# Patient Record
Sex: FEMALE | Race: WHITE | NOT HISPANIC OR LATINO | Employment: UNEMPLOYED | URBAN - NONMETROPOLITAN AREA
[De-identification: names, ages, dates, MRNs, and addresses within clinical notes are randomized per-mention and may not be internally consistent; named-entity substitution may affect disease eponyms.]

---

## 2017-11-20 ENCOUNTER — TRANSCRIBE ORDERS (OUTPATIENT)
Dept: ADMINISTRATIVE | Facility: HOSPITAL | Age: 27
End: 2017-11-20

## 2017-11-20 DIAGNOSIS — N92.6 IRREGULAR BLEEDING: Primary | ICD-10-CM

## 2017-11-28 ENCOUNTER — TRANSCRIBE ORDERS (OUTPATIENT)
Dept: ADMINISTRATIVE | Facility: HOSPITAL | Age: 27
End: 2017-11-28

## 2017-11-28 DIAGNOSIS — N92.6 IRREGULAR UTERINE BLEEDING: Primary | ICD-10-CM

## 2017-11-29 ENCOUNTER — HOSPITAL ENCOUNTER (OUTPATIENT)
Dept: ULTRASOUND IMAGING | Facility: HOSPITAL | Age: 27
Discharge: HOME | End: 2017-11-29

## 2017-11-29 ENCOUNTER — HOSPITAL ENCOUNTER (OUTPATIENT)
Dept: ULTRASOUND IMAGING | Facility: HOSPITAL | Age: 27
Discharge: HOME | End: 2017-11-29
Admitting: PHYSICIAN ASSISTANT

## 2017-11-29 DIAGNOSIS — N92.6 IRREGULAR BLEEDING: ICD-10-CM

## 2017-11-29 DIAGNOSIS — N92.6 IRREGULAR UTERINE BLEEDING: ICD-10-CM

## 2017-11-29 PROCEDURE — 76856 US EXAM PELVIC COMPLETE: CPT

## 2017-11-29 PROCEDURE — 76830 TRANSVAGINAL US NON-OB: CPT

## 2017-11-29 PROCEDURE — 76830 TRANSVAGINAL US NON-OB: CPT | Performed by: RADIOLOGY

## 2017-11-29 PROCEDURE — 76856 US EXAM PELVIC COMPLETE: CPT | Performed by: RADIOLOGY

## 2017-12-06 ENCOUNTER — APPOINTMENT (OUTPATIENT)
Dept: ULTRASOUND IMAGING | Facility: HOSPITAL | Age: 27
End: 2017-12-06

## 2019-03-29 ENCOUNTER — HOSPITAL ENCOUNTER (OUTPATIENT)
Dept: GENERAL RADIOLOGY | Facility: HOSPITAL | Age: 29
Discharge: HOME | End: 2019-03-29

## 2019-03-29 ENCOUNTER — TRANSCRIBE ORDERS (OUTPATIENT)
Dept: ADMINISTRATIVE | Facility: HOSPITAL | Age: 29
End: 2019-03-29

## 2019-03-29 DIAGNOSIS — M54.9 BACK PAIN, UNSPECIFIED BACK LOCATION, UNSPECIFIED BACK PAIN LATERALITY, UNSPECIFIED CHRONICITY: ICD-10-CM

## 2019-03-29 DIAGNOSIS — M54.9 BACK PAIN, UNSPECIFIED BACK LOCATION, UNSPECIFIED BACK PAIN LATERALITY, UNSPECIFIED CHRONICITY: Primary | ICD-10-CM

## 2020-01-15 LAB
EXTERNAL ABO GROUPING: NORMAL
EXTERNAL ANTIBODY SCREEN: NEGATIVE
EXTERNAL CHLAMYDIA SCREEN: NEGATIVE
EXTERNAL GONORRHEA SCREEN: NEGATIVE
EXTERNAL HEPATITIS B SURFACE ANTIGEN: NEGATIVE
EXTERNAL RH FACTOR: POSITIVE
EXTERNAL RUBELLA QUALITATIVE: NORMAL
EXTERNAL SYPHILIS RPR SCREEN: NORMAL
HIV1 P24 AG SERPL QL IA: NORMAL

## 2020-05-01 LAB
EXTERNAL GLUCOSE TOLERANCE TEST FASTING: 97 MG/DL
EXTERNAL GTT 1 HOUR: 181
EXTERNAL GTT 3 HOURS: 134

## 2020-06-23 LAB — EXTERNAL GROUP B STREP ANTIGEN: POSITIVE

## 2020-07-06 ENCOUNTER — HOSPITAL ENCOUNTER (OUTPATIENT)
Facility: HOSPITAL | Age: 30
Discharge: HOME | End: 2020-07-06
Attending: OBSTETRICS & GYNECOLOGY | Admitting: OBSTETRICS & GYNECOLOGY

## 2020-07-06 VITALS
RESPIRATION RATE: 16 BRPM | BODY MASS INDEX: 35.59 KG/M2 | HEIGHT: 64 IN | TEMPERATURE: 98.7 F | WEIGHT: 208.5 LBS | SYSTOLIC BLOOD PRESSURE: 124 MMHG | DIASTOLIC BLOOD PRESSURE: 80 MMHG | HEART RATE: 122 BPM

## 2020-07-06 PROBLEM — Z36.89 NON-STRESS TEST REACTIVE: Status: ACTIVE | Noted: 2020-07-06

## 2020-07-06 PROCEDURE — G0463 HOSPITAL OUTPT CLINIC VISIT: HCPCS

## 2020-07-06 PROCEDURE — 59025 FETAL NON-STRESS TEST: CPT

## 2020-07-06 RX ORDER — HYDROXYZINE PAMOATE 25 MG/1
25 CAPSULE ORAL 3 TIMES DAILY PRN
Qty: 60 CAPSULE | Refills: 0 | Status: SHIPPED | OUTPATIENT
Start: 2020-07-06

## 2020-07-06 RX ORDER — FLUCONAZOLE 100 MG/1
150 TABLET ORAL EVERY 24 HOURS
Status: COMPLETED | OUTPATIENT
Start: 2020-07-06 | End: 2020-07-06

## 2020-07-06 RX ADMIN — FLUCONAZOLE 150 MG: 100 TABLET ORAL at 14:37

## 2020-07-06 NOTE — NON STRESS TEST
Ysabel Biskarime, a  at 38w2d with an STELLA of 2020, by Patient Reported, was seen at [facility] for a nonstress test.    Chief Complaint   Patient presents with   • Non-stress Test     rash on thighs & underarms       Patient Active Problem List   Diagnosis   • Non-stress test reactive       Start Time: 1300  Stop Time: 1320    Interpretation A  Nonstress Test Interpretation A: Reactive (20 1254 : India Easley, RN)    brooks fajardo rn

## 2020-07-06 NOTE — H&P
RAKEL Bhatti  Obstetric History and Physical    Chief Complaint   Patient presents with   • Non-stress Test     rash on thighs & underarms       Subjective     Patient is a 30 y.o. female  currently at 38w2d, who presents with rash under both arms and in groin area for 3-4 days. Pt denies any other c/o. Pt reports good BS control. Pt reports +FM, denies VB/LOF/ctx.    Her prenatal care is complicated by  diabetes  GDM A1.  Her previous obstetric/gynecological history is noted for is non-contributory.    The following portions of the patients history were reviewed and updated as appropriate: current medications, allergies, past medical history, past surgical history, past family history, past social history and problem list .   Social History     Socioeconomic History   • Marital status:      Spouse name: Not on file   • Number of children: Not on file   • Years of education: Not on file   • Highest education level: Not on file     No past medical history on file.    Current Facility-Administered Medications:   •  fluconazole (DIFLUCAN) tablet 150 mg, 150 mg, Oral, Q24H, Priti Durant,   Allergies no known allergies  No past surgical history on file.      Prenatal Information:   Maternal Prenatal Labs  Blood Type No results found for: ABO   Rh Status No results found for: RH   Antibody Screen No results found for: ABSCRN   Rapid Urine Drug Screen No results found for: AMPMETHU, BARBITSCNUR, LABBENZSCN, LABMETHSCN, LABOPIASCN, THCURSCR, COCAINEUR, AMPHETSCREEN, PROPOXSCN, BUPRENORSCNU, METAMPSCNUR, OXYCODONESCN, TRICYCLICSCN   Group B Strep Culture No results found for: GBSANTIGEN                 Past OB History:     OB History    Para Term  AB Living   2 1 1 0 0 0   SAB TAB Ectopic Molar Multiple Live Births   0 0 0 0 0 0      # Outcome Date GA Lbr Lauro/2nd Weight Sex Delivery Anes PTL Lv   2 Current            1 Term 10/06/11               Past Medical History: No past medical  history on file.   Past Surgical History No past surgical history on file.   Family History: No family history on file.   Social History:  has no tobacco history on file.   has no alcohol history on file.   has no drug history on file.        Review of Systems      Objective     Vital Signs Range for the last 24 hours  Temperature: Temp:  [98.7 °F (37.1 °C)] 98.7 °F (37.1 °C)   Temp Source: Temp src: Oral   BP: BP: (124)/(80) 124/80   Pulse: Heart Rate:  [122] 122   Respirations: Resp:  [16] 16   Weight: Weight:  [94.6 kg (208 lb 8 oz)] 94.6 kg (208 lb 8 oz)     Physical Examination: General appearance - alert, well appearing, and in no distress, oriented to person, place, and time, normal appearing weight and well hydrated  Mental status - alert, oriented to person, place, and time, normal mood, behavior, speech, dress, motor activity, and thought processes, affect appropriate to mood  Neck - supple, no significant adenopathy  Chest - clear to auscultation, no wheezes, rales or rhonchi, symmetric air entry, no tachypnea, retractions or cyanosis  Heart - normal rate, regular rhythm, normal S1, S2, no murmurs, rubs, clicks or gallops  Abdomen - soft, nontender, gravid uterus, no masses or organomegaly  no rebound tenderness noted,   Pelvic - normal external genitalia, vulva, vagina, cervix, uterus and adnexa  Neurological - alert, oriented, normal speech, no focal findings or movement disorder noted  Musculoskeletal - no joint tenderness, deformity or swelling  Extremities - peripheral pulses normal, no pedal edema, no clubbing or cyanosis  Skin - normal coloration and turgor, no rashes, no suspicious skin lesions noted        Cervix: Exam by:     Dilation:     Effacement:     Station:       Laboratory Results:   Lab Results (last 24 hours)     ** No results found for the last 24 hours. **        Radiology Review:   Imaging Results (Last 72 Hours)     ** No results found for the last 72 hours. **             Assessment/Plan       Non-stress test reactive      Assessment & Plan    Assessment:  1.  Intrauterine pregnancy at 38w2d weeks gestation with reassuring fetal status.    2.  Pt 38.2 weeks with rash under arms and in groin area  3.  Obstetrical history significant for is non-contributory.  4.  GBS status: No results found for: GBSANTIGEN    Plan:  1. Rx for diflucan and vistaril given. R/B/A discussed, for IOL in 1 weeks.  2. Plan of care has been reviewed with patient   3.  Risks, benefits of treatment plan have been discussed.  4.  All questions have been answered.        Priti Durant, DO  7/6/2020  13:45

## 2020-07-06 NOTE — DISCHARGE INSTRUCTIONS
Pruritus  Pruritus is an itchy feeling on the skin. One of the most common causes is dry skin, but many different things can cause itching. Most cases of itching do not require medical attention. Sometimes itchy skin can turn into a rash.  Follow these instructions at home:  Skin care    · Apply moisturizing lotion to your skin as needed. Lotion that contains petroleum jelly is best.  · Take medicines or apply medicated creams only as told by your health care provider. This may include:  ? Corticosteroid cream.  ? Anti-itch lotions.  ? Oral antihistamines.  · Apply a cool, wet cloth (cool compress) to the affected areas.  · Take baths with one of the following:  ? Epsom salts. You can get these at your local pharmacy or grocery store. Follow the instructions on the packaging.  ? Baking soda. Pour a small amount into the bath as told by your health care provider.  ? Colloidal oatmeal. You can get this at your local pharmacy or grocery store. Follow the instructions on the packaging.  · Apply baking soda paste to your skin. To make the paste, stir water into a small amount of baking soda until it reaches a paste-like consistency.  · Do not scratch your skin.  · Do not take hot showers or baths, which can make itching worse. A cool shower may help with itching as long as you apply moisturizing lotion after the shower.  · Do not use scented soaps, detergents, perfumes, and cosmetic products. Instead, use gentle, unscented versions of these items.  General instructions  · Avoid wearing tight clothes.  · Keep a journal to help find out what is causing your itching. Write down:  ? What you eat and drink.  ? What cosmetic products you use.  ? What soaps or detergents you use.  ? What you wear, including jewelry.  · Use a humidifier. This keeps the air moist, which helps to prevent dry skin.  · Be aware of any changes in your itchiness.  Contact a health care provider if:  · The itching does not go away after several  days.  · You are unusually thirsty or urinating more than normal.  · Your skin tingles or feels numb.  · Your skin or the white parts of your eyes turn yellow (jaundice).  · You feel weak.  · You have any of the following:  ? Night sweats.  ? Tiredness (fatigue).  ? Weight loss.  ? Abdominal pain.  Summary  · Pruritus is an itchy feeling on the skin. One of the most common causes is dry skin, but many different conditions and factors can cause itching.  · Apply moisturizing lotion to your skin as needed. Lotion that contains petroleum jelly is best.  · Take medicines or apply medicated creams only as told by your health care provider.  · Do not take hot showers or baths. Do not use scented soaps, detergents, perfumes, or cosmetic products.  This information is not intended to replace advice given to you by your health care provider. Make sure you discuss any questions you have with your health care provider.  Document Released: 08/29/2012 Document Revised: 01/01/2019 Document Reviewed: 01/01/2019  [employer] Patient Education © 2020 [employer]  Fetal Monitoring Overview  Monitoring your developing baby (fetus) before birth can identify potential problems for you and your baby. Fetal monitoring can:  · Help prevent serious problems from developing.  · Guide health care providers in how best to care for your unborn baby.  · Check your unborn baby’s overall health.  The amount of monitoring and the specific tests that will be done will vary. It will depend on whether your pregnancy is considered to be low or high risk. For example, you may need certain tests if you have a medical problem that can put your unborn baby at risk.  Health care providers use several techniques and tests to monitor your baby before birth. No single test is perfectly accurate, and you may need to follow up with your health care provider if there are any concerns.  Fetal movement counts  When you are past 20 weeks of pregnancy, you may feel  your baby move. As your baby gets bigger, these movements are easier to feel. A fetal movement count is when you count the number of movements (kicks, flutters, swishes, rolls, or jabs) over a specific period of time. You record the number and report it to your health care provider. This is often recommended in high-risk pregnancies, but it is good for every pregnant woman to do. Your health care provider may ask you to start counting fetal movements at 28 weeks of pregnancy.  Non-stress test  The non-stress test:  · Evaluates fetal heartbeat:  ? While your baby is at rest.  ? While your baby is moving.  · Is often done as part of a set of tests called the biophysical profile.  · May show signs that it is time for your baby to be delivered.  The heart rate in a healthy baby will speed up when the baby moves or kicks. The heart rate will decrease at rest, and the peaks or accelerations of the heart rate will be lower.  If the test brings up any questions or concerns, you may need to have more testing. This test may be done if your pregnancy goes past your due date. It is also commonly done in high-risk pregnancies beginning at 32 weeks.  Biophysical profile  The biophysical profile is a set of tests that are done to find out how well the baby is doing. It includes the non-stress test along with imaging tests that use sound waves (ultrasound) to create images of your baby. In a biophysical profile, the following tests are done and scored:  · Fetal heartbeat.  · Fetal breathing.  · Fetal movements.  · Fetal muscle tone.  · Amount of amniotic fluid.  Each test gets a score of either 2 (normal) or 0 (abnormal). The scores are then added together for a total score. A low score may mean that you and your baby need additional monitoring or special care. Sometimes your health care provider may recommend that you deliver early. This test is usually done after 32 weeks of pregnancy. It can be done sooner, if  needed.  Contraction stress test  A contraction stress test monitors the heart rate of your baby during contractions. The test checks to see how your baby will tolerate the stress of labor. Health care providers use this test to further evaluate your baby when other tests, such as the biophysical profile, have shown that there may be a problem. This test may be done:  · Any time you are in labor.  · Between weeks 32 and 34 of your pregnancy.  · Later, if necessary.  Modified biophysical profile  A modified biophysical profile is a two-part test. You will have:  · An ultrasound exam, to check how much fluid is surrounding your baby inside of your womb (amniotic fluid).  · A non-stress test, to check your baby’s heart rate.  The results will determine whether a full biophysical profile may be needed. This test is usually done late in your final 3 months (third trimester) of pregnancy.  Umbilical artery Doppler velocimetry  Umbilical artery Doppler velocimetry uses sound waves to measure the flow of blood between you and your baby. This test:  · Measures the amount of blood flow through the cord that attaches your baby to your womb (umbilical cord).  · Measures the speed of the blood flow.  You likely only need this test to check your baby's condition if you have a high-risk pregnancy.  All types of monitoring aim to protect your health and that of your baby. The best way to have a healthy pregnancy and a healthy baby is to learn as much as you can about your pregnancy and to work closely with all your health care providers.  This information is not intended to replace advice given to you by your health care provider. Make sure you discuss any questions you have with your health care provider.  Document Released: 12/08/2003 Document Revised: 09/11/2018 Document Reviewed: 07/17/2017  Elsevier Patient Education © 2020 [employer]

## 2020-07-08 ENCOUNTER — HOSPITAL ENCOUNTER (INPATIENT)
Facility: HOSPITAL | Age: 30
LOS: 3 days | Discharge: HOME | End: 2020-07-11
Attending: OBSTETRICS & GYNECOLOGY | Admitting: OBSTETRICS & GYNECOLOGY

## 2020-07-08 PROBLEM — O42.90 AMNIOTIC FLUID LEAKING: Status: ACTIVE | Noted: 2020-07-08

## 2020-07-08 PROCEDURE — 59025 FETAL NON-STRESS TEST: CPT

## 2020-07-08 RX ORDER — SODIUM CHLORIDE, SODIUM LACTATE, POTASSIUM CHLORIDE, CALCIUM CHLORIDE 600; 310; 30; 20 MG/100ML; MG/100ML; MG/100ML; MG/100ML
125 INJECTION, SOLUTION INTRAVENOUS CONTINUOUS
Status: DISCONTINUED | OUTPATIENT
Start: 2020-07-09 | End: 2020-07-11 | Stop reason: HOSPADM

## 2020-07-08 RX ORDER — SODIUM CHLORIDE 0.9 % (FLUSH) 0.9 %
3 SYRINGE (ML) INJECTION EVERY 12 HOURS SCHEDULED
Status: DISCONTINUED | OUTPATIENT
Start: 2020-07-09 | End: 2020-07-09 | Stop reason: HOSPADM

## 2020-07-08 RX ORDER — LIDOCAINE HYDROCHLORIDE 10 MG/ML
5 INJECTION, SOLUTION EPIDURAL; INFILTRATION; INTRACAUDAL; PERINEURAL AS NEEDED
Status: DISCONTINUED | OUTPATIENT
Start: 2020-07-08 | End: 2020-07-09 | Stop reason: HOSPADM

## 2020-07-08 RX ORDER — SODIUM CHLORIDE 0.9 % (FLUSH) 0.9 %
3-10 SYRINGE (ML) INJECTION AS NEEDED
Status: DISCONTINUED | OUTPATIENT
Start: 2020-07-08 | End: 2020-07-09 | Stop reason: HOSPADM

## 2020-07-08 RX ADMIN — SODIUM CHLORIDE, POTASSIUM CHLORIDE, SODIUM LACTATE AND CALCIUM CHLORIDE 1000 ML: 600; 310; 30; 20 INJECTION, SOLUTION INTRAVENOUS at 23:40

## 2020-07-08 RX ADMIN — AMPICILLIN SODIUM 2 G: 2 INJECTION, POWDER, FOR SOLUTION INTRAVENOUS at 23:50

## 2020-07-09 ENCOUNTER — ANESTHESIA EVENT (OUTPATIENT)
Dept: LABOR AND DELIVERY | Facility: HOSPITAL | Age: 30
End: 2020-07-09

## 2020-07-09 ENCOUNTER — ANESTHESIA (OUTPATIENT)
Dept: LABOR AND DELIVERY | Facility: HOSPITAL | Age: 30
End: 2020-07-09

## 2020-07-09 LAB
ABO GROUP BLD: NORMAL
BLD GP AB SCN SERPL QL: NEGATIVE
DEPRECATED RDW RBC AUTO: 47.8 FL (ref 37–54)
ERYTHROCYTE [DISTWIDTH] IN BLOOD BY AUTOMATED COUNT: 14.7 % (ref 12.3–15.4)
HCT VFR BLD AUTO: 31.8 % (ref 34–46.6)
HGB BLD-MCNC: 9.7 G/DL (ref 12–15.9)
MCH RBC QN AUTO: 27.4 PG (ref 26.6–33)
MCHC RBC AUTO-ENTMCNC: 30.5 G/DL (ref 31.5–35.7)
MCV RBC AUTO: 89.8 FL (ref 79–97)
PLATELET # BLD AUTO: 209 10*3/MM3 (ref 140–450)
PMV BLD AUTO: 9.1 FL (ref 6–12)
RBC # BLD AUTO: 3.54 10*6/MM3 (ref 3.77–5.28)
RH BLD: POSITIVE
T&S EXPIRATION DATE: NORMAL
WBC # BLD AUTO: 14.38 10*3/MM3 (ref 3.4–10.8)

## 2020-07-09 PROCEDURE — C1755 CATHETER, INTRASPINAL: HCPCS

## 2020-07-09 PROCEDURE — 25010000002 ROPIVACAINE PER 1 MG

## 2020-07-09 PROCEDURE — 86901 BLOOD TYPING SEROLOGIC RH(D): CPT | Performed by: OBSTETRICS & GYNECOLOGY

## 2020-07-09 PROCEDURE — 85027 COMPLETE CBC AUTOMATED: CPT | Performed by: OBSTETRICS & GYNECOLOGY

## 2020-07-09 PROCEDURE — 25010000003 AMPICILLIN PER 500 MG: Performed by: OBSTETRICS & GYNECOLOGY

## 2020-07-09 PROCEDURE — 86900 BLOOD TYPING SEROLOGIC ABO: CPT | Performed by: OBSTETRICS & GYNECOLOGY

## 2020-07-09 PROCEDURE — C1755 CATHETER, INTRASPINAL: HCPCS | Performed by: ANESTHESIOLOGY

## 2020-07-09 PROCEDURE — 25010000002 AMPICILLIN PER 500 MG: Performed by: OBSTETRICS & GYNECOLOGY

## 2020-07-09 PROCEDURE — 86850 RBC ANTIBODY SCREEN: CPT | Performed by: OBSTETRICS & GYNECOLOGY

## 2020-07-09 PROCEDURE — 25010000002 BUTORPHANOL PER 1 MG: Performed by: OBSTETRICS & GYNECOLOGY

## 2020-07-09 RX ORDER — HYDROCORTISONE ACETATE PRAMOXINE HCL 1; 1 G/100G; G/100G
1 CREAM TOPICAL AS NEEDED
Status: DISCONTINUED | OUTPATIENT
Start: 2020-07-09 | End: 2020-07-11 | Stop reason: HOSPADM

## 2020-07-09 RX ORDER — CARBOPROST TROMETHAMINE 250 UG/ML
250 INJECTION, SOLUTION INTRAMUSCULAR ONCE
Status: DISCONTINUED | OUTPATIENT
Start: 2020-07-09 | End: 2020-07-11 | Stop reason: HOSPADM

## 2020-07-09 RX ORDER — OXYTOCIN-SODIUM CHLORIDE 0.9% IV SOLN 30 UNIT/500ML 30-0.9/5 UT/ML-%
125 SOLUTION INTRAVENOUS CONTINUOUS PRN
Status: DISCONTINUED | OUTPATIENT
Start: 2020-07-09 | End: 2020-07-11 | Stop reason: HOSPADM

## 2020-07-09 RX ORDER — BUPIVACAINE HYDROCHLORIDE 2.5 MG/ML
INJECTION, SOLUTION EPIDURAL; INFILTRATION; INTRACAUDAL
Status: COMPLETED
Start: 2020-07-09 | End: 2020-07-09

## 2020-07-09 RX ORDER — BISACODYL 10 MG
10 SUPPOSITORY, RECTAL RECTAL DAILY PRN
Status: DISCONTINUED | OUTPATIENT
Start: 2020-07-10 | End: 2020-07-11 | Stop reason: HOSPADM

## 2020-07-09 RX ORDER — PRENATAL VIT/IRON FUM/FOLIC AC 27MG-0.8MG
1 TABLET ORAL DAILY
Status: DISCONTINUED | OUTPATIENT
Start: 2020-07-10 | End: 2020-07-11 | Stop reason: HOSPADM

## 2020-07-09 RX ORDER — ONDANSETRON 2 MG/ML
4 INJECTION INTRAMUSCULAR; INTRAVENOUS EVERY 6 HOURS PRN
Status: DISCONTINUED | OUTPATIENT
Start: 2020-07-09 | End: 2020-07-11 | Stop reason: HOSPADM

## 2020-07-09 RX ORDER — OXYTOCIN-SODIUM CHLORIDE 0.9% IV SOLN 30 UNIT/500ML 30-0.9/5 UT/ML-%
999 SOLUTION INTRAVENOUS ONCE
Status: DISCONTINUED | OUTPATIENT
Start: 2020-07-09 | End: 2020-07-11 | Stop reason: HOSPADM

## 2020-07-09 RX ORDER — BUPIVACAINE HYDROCHLORIDE 2.5 MG/ML
INJECTION, SOLUTION EPIDURAL; INFILTRATION; INTRACAUDAL AS NEEDED
Status: DISCONTINUED | OUTPATIENT
Start: 2020-07-09 | End: 2020-07-09 | Stop reason: SURG

## 2020-07-09 RX ORDER — FAMOTIDINE 20 MG/1
20 TABLET, FILM COATED ORAL 2 TIMES DAILY PRN
Status: CANCELLED | OUTPATIENT
Start: 2020-07-09

## 2020-07-09 RX ORDER — ONDANSETRON 2 MG/ML
4 INJECTION INTRAMUSCULAR; INTRAVENOUS ONCE AS NEEDED
Status: DISCONTINUED | OUTPATIENT
Start: 2020-07-09 | End: 2020-07-09 | Stop reason: HOSPADM

## 2020-07-09 RX ORDER — PRENATAL VIT/IRON FUM/FOLIC AC 27MG-0.8MG
1 TABLET ORAL DAILY
Status: DISCONTINUED | OUTPATIENT
Start: 2020-07-09 | End: 2020-07-09 | Stop reason: SDUPTHER

## 2020-07-09 RX ORDER — HYDROCODONE BITARTRATE AND ACETAMINOPHEN 5; 325 MG/1; MG/1
1 TABLET ORAL EVERY 4 HOURS PRN
Status: DISCONTINUED | OUTPATIENT
Start: 2020-07-09 | End: 2020-07-11 | Stop reason: HOSPADM

## 2020-07-09 RX ORDER — LANOLIN 100 %
OINTMENT (GRAM) TOPICAL
Status: DISCONTINUED | OUTPATIENT
Start: 2020-07-09 | End: 2020-07-11 | Stop reason: HOSPADM

## 2020-07-09 RX ORDER — CARBOPROST TROMETHAMINE 250 UG/ML
250 INJECTION, SOLUTION INTRAMUSCULAR AS NEEDED
Status: DISCONTINUED | OUTPATIENT
Start: 2020-07-09 | End: 2020-07-09 | Stop reason: HOSPADM

## 2020-07-09 RX ORDER — METOCLOPRAMIDE 10 MG/1
10 TABLET ORAL ONCE
Status: DISCONTINUED | OUTPATIENT
Start: 2020-07-09 | End: 2020-07-11 | Stop reason: HOSPADM

## 2020-07-09 RX ORDER — ACETAMINOPHEN 500 MG
1000 TABLET ORAL EVERY 6 HOURS PRN
Status: DISCONTINUED | OUTPATIENT
Start: 2020-07-09 | End: 2020-07-11 | Stop reason: HOSPADM

## 2020-07-09 RX ORDER — IBUPROFEN 600 MG/1
600 TABLET ORAL 4 TIMES DAILY
Status: DISCONTINUED | OUTPATIENT
Start: 2020-07-09 | End: 2020-07-10

## 2020-07-09 RX ORDER — MISOPROSTOL 100 UG/1
600 TABLET ORAL ONCE
Status: DISCONTINUED | OUTPATIENT
Start: 2020-07-09 | End: 2020-07-11 | Stop reason: HOSPADM

## 2020-07-09 RX ORDER — METHYLERGONOVINE MALEATE 0.2 MG/ML
200 INJECTION INTRAVENOUS ONCE AS NEEDED
Status: DISCONTINUED | OUTPATIENT
Start: 2020-07-09 | End: 2020-07-09 | Stop reason: HOSPADM

## 2020-07-09 RX ORDER — OXYTOCIN-SODIUM CHLORIDE 0.9% IV SOLN 30 UNIT/500ML 30-0.9/5 UT/ML-%
SOLUTION INTRAVENOUS
Status: DISCONTINUED
Start: 2020-07-09 | End: 2020-07-11 | Stop reason: HOSPADM

## 2020-07-09 RX ORDER — HYDROCORTISONE 25 MG/G
1 CREAM TOPICAL AS NEEDED
Status: DISCONTINUED | OUTPATIENT
Start: 2020-07-09 | End: 2020-07-11 | Stop reason: HOSPADM

## 2020-07-09 RX ORDER — LIDOCAINE HYDROCHLORIDE 10 MG/ML
INJECTION, SOLUTION INFILTRATION; PERINEURAL
Status: DISCONTINUED
Start: 2020-07-09 | End: 2020-07-11 | Stop reason: HOSPADM

## 2020-07-09 RX ORDER — FAMOTIDINE 10 MG/ML
20 INJECTION, SOLUTION INTRAVENOUS EVERY 4 HOURS PRN
Status: DISCONTINUED | OUTPATIENT
Start: 2020-07-09 | End: 2020-07-09

## 2020-07-09 RX ORDER — DOCUSATE SODIUM 100 MG/1
100 CAPSULE, LIQUID FILLED ORAL 2 TIMES DAILY
Status: DISCONTINUED | OUTPATIENT
Start: 2020-07-09 | End: 2020-07-11 | Stop reason: HOSPADM

## 2020-07-09 RX ORDER — SODIUM CHLORIDE 0.9 % (FLUSH) 0.9 %
1-10 SYRINGE (ML) INJECTION AS NEEDED
Status: DISCONTINUED | OUTPATIENT
Start: 2020-07-09 | End: 2020-07-11 | Stop reason: HOSPADM

## 2020-07-09 RX ORDER — CALCIUM CARBONATE 200(500)MG
1 TABLET,CHEWABLE ORAL 3 TIMES DAILY PRN
Status: DISCONTINUED | OUTPATIENT
Start: 2020-07-09 | End: 2020-07-11 | Stop reason: HOSPADM

## 2020-07-09 RX ORDER — OXYTOCIN-SODIUM CHLORIDE 0.9% IV SOLN 30 UNIT/500ML 30-0.9/5 UT/ML-%
250 SOLUTION INTRAVENOUS CONTINUOUS
Status: ACTIVE | OUTPATIENT
Start: 2020-07-09 | End: 2020-07-09

## 2020-07-09 RX ORDER — ROPIVACAINE HYDROCHLORIDE 2 MG/ML
INJECTION, SOLUTION EPIDURAL; INFILTRATION; PERINEURAL
Status: COMPLETED
Start: 2020-07-09 | End: 2020-07-09

## 2020-07-09 RX ORDER — FAMOTIDINE 20 MG/1
20 TABLET, FILM COATED ORAL 2 TIMES DAILY PRN
COMMUNITY

## 2020-07-09 RX ORDER — HYDROXYZINE 50 MG/1
25 TABLET, FILM COATED ORAL 3 TIMES DAILY PRN
Status: DISCONTINUED | OUTPATIENT
Start: 2020-07-09 | End: 2020-07-11 | Stop reason: HOSPADM

## 2020-07-09 RX ORDER — FAMOTIDINE 20 MG/1
20 TABLET, FILM COATED ORAL
Status: DISCONTINUED | OUTPATIENT
Start: 2020-07-09 | End: 2020-07-11 | Stop reason: HOSPADM

## 2020-07-09 RX ORDER — EPHEDRINE SULFATE 50 MG/ML
10 INJECTION, SOLUTION INTRAVENOUS
Status: DISCONTINUED | OUTPATIENT
Start: 2020-07-09 | End: 2020-07-09 | Stop reason: HOSPADM

## 2020-07-09 RX ORDER — ONDANSETRON 4 MG/1
4 TABLET, FILM COATED ORAL EVERY 6 HOURS PRN
Status: DISCONTINUED | OUTPATIENT
Start: 2020-07-09 | End: 2020-07-11 | Stop reason: HOSPADM

## 2020-07-09 RX ORDER — BUTORPHANOL TARTRATE 1 MG/ML
1 INJECTION, SOLUTION INTRAMUSCULAR; INTRAVENOUS
Status: DISCONTINUED | OUTPATIENT
Start: 2020-07-09 | End: 2020-07-11 | Stop reason: HOSPADM

## 2020-07-09 RX ORDER — FAMOTIDINE 10 MG/ML
20 INJECTION, SOLUTION INTRAVENOUS ONCE AS NEEDED
Status: DISCONTINUED | OUTPATIENT
Start: 2020-07-09 | End: 2020-07-09 | Stop reason: HOSPADM

## 2020-07-09 RX ORDER — HYDROXYZINE 50 MG/1
25 TABLET, FILM COATED ORAL 3 TIMES DAILY PRN
Status: CANCELLED | OUTPATIENT
Start: 2020-07-09

## 2020-07-09 RX ORDER — PRENATAL VIT/IRON FUM/FOLIC AC 27MG-0.8MG
1 TABLET ORAL DAILY
COMMUNITY

## 2020-07-09 RX ORDER — ROPIVACAINE HYDROCHLORIDE 2 MG/ML
14 INJECTION, SOLUTION EPIDURAL; INFILTRATION; PERINEURAL CONTINUOUS
Status: DISCONTINUED | OUTPATIENT
Start: 2020-07-09 | End: 2020-07-11 | Stop reason: HOSPADM

## 2020-07-09 RX ORDER — MISOPROSTOL 100 UG/1
800 TABLET ORAL AS NEEDED
Status: DISCONTINUED | OUTPATIENT
Start: 2020-07-09 | End: 2020-07-09 | Stop reason: HOSPADM

## 2020-07-09 RX ORDER — METHYLERGONOVINE MALEATE 0.2 MG/ML
200 INJECTION INTRAVENOUS ONCE AS NEEDED
Status: DISCONTINUED | OUTPATIENT
Start: 2020-07-09 | End: 2020-07-11 | Stop reason: HOSPADM

## 2020-07-09 RX ORDER — DIPHENHYDRAMINE HCL 25 MG
25 CAPSULE ORAL NIGHTLY PRN
Status: DISCONTINUED | OUTPATIENT
Start: 2020-07-09 | End: 2020-07-11 | Stop reason: HOSPADM

## 2020-07-09 RX ADMIN — FAMOTIDINE 20 MG: 20 TABLET ORAL at 22:44

## 2020-07-09 RX ADMIN — DOCUSATE SODIUM 100 MG: 100 CAPSULE ORAL at 21:12

## 2020-07-09 RX ADMIN — Medication: at 22:46

## 2020-07-09 RX ADMIN — ROPIVACAINE HYDROCHLORIDE 14 ML/HR: 2 INJECTION, SOLUTION EPIDURAL; INFILTRATION at 05:44

## 2020-07-09 RX ADMIN — AMPICILLIN SODIUM 1 G: 1 INJECTION, POWDER, FOR SOLUTION INTRAMUSCULAR; INTRAVENOUS at 04:11

## 2020-07-09 RX ADMIN — BUTORPHANOL TARTRATE 1 MG: 1 INJECTION, SOLUTION INTRAMUSCULAR; INTRAVENOUS at 02:11

## 2020-07-09 RX ADMIN — SODIUM CHLORIDE, POTASSIUM CHLORIDE, SODIUM LACTATE AND CALCIUM CHLORIDE 125 ML/HR: 600; 310; 30; 20 INJECTION, SOLUTION INTRAVENOUS at 05:46

## 2020-07-09 RX ADMIN — BUPIVACAINE HYDROCHLORIDE 10 ML: 2.5 INJECTION, SOLUTION EPIDURAL; INFILTRATION; INTRACAUDAL; PERINEURAL at 05:44

## 2020-07-09 RX ADMIN — FAMOTIDINE 20 MG: 10 INJECTION INTRAVENOUS at 01:27

## 2020-07-09 RX ADMIN — BUTORPHANOL TARTRATE 2 MG: 2 INJECTION, SOLUTION INTRAMUSCULAR; INTRAVENOUS at 04:57

## 2020-07-09 RX ADMIN — ROPIVACAINE HYDROCHLORIDE 14 ML/HR: 2 INJECTION, SOLUTION EPIDURAL; INFILTRATION; PERINEURAL at 05:44

## 2020-07-09 RX ADMIN — ACETAMINOPHEN 1000 MG: 500 TABLET ORAL at 17:24

## 2020-07-09 NOTE — L&D DELIVERY NOTE
Vaginal Delivery Procedure Note    Ysabel Barrios  38w 5d  G 2, P1      OBGYN: Wm Perez MD      Pre-op Diagnosis: Complete Dilation      Anesthesia: Epidual        Detailed Description of Procedure     The patient was prepped and draped in normal sterile fashion. The head was delivered over a midline episiotomy. No extensions Repaired 2-0 Vicryl.. The nares and mouth were bulb suctioned. There was no nuchal cord. Anterior and posterior shoulders delivered without any problems. The rest of the infant was delivered in controlled fashion. The placenta delivered intact. The patient tolerated the procedure well and went to the recovery room in stable condition.        Maternal Blood Type: A   Fetal Gender: F  Nuchal Cord: No  Tears: Midline Episiotomy    Amniotic Fluid Clear  Blood Cord: Yes  Estimated Blood Loss: 300cc  Placenta: Spontaneous, Delivered Intact   Uterus Explored: Yes  Membranes: SROM  APGARS: 8 & 9    Disposition: Transfer to Women's Health Floor  Condition: Stable    Wm Perez M.D     Date: 7/9/2020  Time: 08:58

## 2020-07-09 NOTE — ANESTHESIA PROCEDURE NOTES
Labor Epidural    Pre-sedation assessment completed: 7/9/2020 5:33 AM    Patient location during procedure: OB  Start Time: 7/9/2020 5:33 AM  Stop Time: 7/9/2020 5:46 AM  Indication:at surgeon's request  Performed By  Anesthesiologist: Burton Villela MD  Preanesthetic Checklist  Completed: patient identified, site marked, surgical consent, pre-op evaluation, timeout performed, IV checked, risks and benefits discussed and monitors and equipment checked  Prep:  Pt Position:sitting  Sterile Tech:gloves, mask, sterile barrier and cap  Prep:povidone-iodine 7.5% surgical scrub  Monitoring:blood pressure monitoring  Epidural Block Procedure:  Approach:midline  Guidance:landmark technique and palpation technique  Location:L3-L4  Needle Type:Tuohy  Needle Gauge:17 G  Loss of Resistance Medium: air  Loss of Resistance: 7cm  Cath Depth at skin:8 cm  Paresthesia: none  Aspiration:negative  Test Dose:negative  Number of Attempts: 1  Post Assessment:  Dressing:occlusive dressing applied and secured with tape  Pt Tolerance:patient tolerated the procedure well with no apparent complications  Complications:no

## 2020-07-09 NOTE — PAYOR COMM NOTE
"CONTACT:  ZULY COLLINS MSN, APRN  UTILIZATION MANAGEMENT DEPT.  [facility]  [address]  [place], [address]  PHONE:  [phone]  FAX: [phone]    ADMISSION NOTIFICATION OF INPATIENT ADMISSION.    Ysabel Barrios (30 y.o. Female)     Date of Birth Social Security Number Address Home Phone MRN    [id number]  [address] [place] [id number] [phone] [id number]    Restoration Marital Status          [facility]        Admission Date Admission Type Admitting Provider Attending Provider Department, Room/Bed    7/8/20 Urgent Dori Simmons, DO Dori Simmons,  [facility] LABOR DELIVERY, L228/1    Discharge Date Discharge Disposition Discharge Destination                       Attending Provider:  Dori Simmons DO    Allergies:  No Known Allergies    Isolation:  None   Infection:  None   Code Status:  CPR    Ht:  162.6 cm (64.02\")   Wt:  94.3 kg (208 lb)    Admission Cmt:  None   Principal Problem:  None                Active Insurance as of 7/8/2020     Primary Coverage     Payor Plan Insurance Group Employer/Plan Group    [email or web address] Application [place] [employer] [facility]      Payor Plan Address Payor Plan Phone Number Payor Plan Fax Number Effective Dates    [address] [address]   1/1/2020 - None Entered    [place] [address]       Subscriber Name Subscriber Birth Date Member ID       YSABEL BARRIOS 1990 [id number]                 Emergency Contacts      (Rel.) Home Phone Work Phone Mobile Phone    Bebo Barrios (Spouse) [phone] -- [phone]    Pelon Solares (Step Parent) [phone] --    Jose Ramon Figueroa (Brother) -- -- [phone]    Ajay Durant (Brother) -- -- [phone]               History & Physical      H&P signed by New Onbase, [place] at 07/09/20 0633      Scan on 7/8/2020 by New Onbase, [place]: [facility]amp[facility]          Electronically signed by New Onbase, [place] at " 20 0633       Anisha Kruger, RN   Registered Nurse   Obstetrics   Nursing Note   Signed   Date of Service:  20 2330   Creation Time:  20 004            Signed             New admit to room 228 to the services of Dr. Simmons, G2,P1 @ 38 4/7 weeks gestation with complaints of leaking of fluid meconium stained, and abdominal cramping, denies any VB and has +FM              [id number] Date of Delivery:20Gestational Age:38w5d       Facility-Administered Medications as of 2020   Medication Dose Route Frequency Provider Last Rate Last Dose   • [COMPLETED] ampicillin 2 g/100 mL 0.9% NS (MBP)  2 g Intravenous Once Dori Simmons DO   2 g at 20 2350    Followed by   • ampicillin 1 g/100 mL 0.9% NS (MBP)  1 g Intravenous Q4H Dori Simmons DO   1 g at 20 0411   • [COMPLETED] bupivacaine (PF) (MARCAINE) 0.25 % injection  - ADS Override Pull            • butorphanol (STADOL) injection 1 mg  1 mg Intravenous Q2H PRN Dori Simmons DO   1 mg at 20 0211   • butorphanol (STADOL) injection 2 mg  2 mg Intravenous Q3H PRN Dori Simmons DO   2 mg at 20 0457   • ePHEDrine injection 10 mg  10 mg Intravenous Q5 Min PRN Burton Villela MD       • famotidine (PEPCID) injection 20 mg  20 mg Intravenous Q4H PRN Dori Simmons DO   20 mg at 20 0127   • famotidine (PEPCID) injection 20 mg  20 mg Intravenous Once PRN Burton Villela MD       • [COMPLETED] fluconazole (DIFLUCAN) tablet 150 mg  150 mg Oral Q24H Priti Durant, DO   150 mg at 20 1437   • hydrOXYzine (ATARAX) tablet 25 mg  25 mg Oral TID PRN Wm Perez III, MD       • [COMPLETED] lactated ringers bolus 1,000 mL  1,000 mL Intravenous Once PRN Dori Simmons DO 2,000 mL/hr at 20 2340 1,000 mL at 20 2340   • lactated ringers infusion  125 mL/hr Intravenous Continuous Dori Simmons,  mL/hr at 20 0546 125 mL/hr at 20 0546   • lidocaine  PF 1% (XYLOCAINE) injection 5 mL  5 mL Intradermal PRN Dori Simmons DO       • ondansetron (ZOFRAN) injection 4 mg  4 mg Intravenous Once PRN Burton Villela MD       • [START ON 7/10/2020] prenatal vitamin 27-0.8 tablet 1 tablet  1 tablet Oral Daily Wm Perez III, MD       • ropivacaine (NAROPIN) 0.2 % injection  14 mL/hr Epidural Continuous Burton Villela MD 14 mL/hr at 07/09/20 0544 14 mL/hr at 07/09/20 0544   • sodium chloride 0.9 % flush 3 mL  3 mL Intravenous Q12H Dori Simmons DO   Stopped at 07/09/20 0034   • sodium chloride 0.9 % flush 3-10 mL  3-10 mL Intravenous PRN Dori Simmons DO         Lab Results (all)     Procedure Component Value Units Date/Time    CBC (No Diff) [[id number]]  (Abnormal) Collected:  07/09/20 0036    Specimen:  Blood Updated:  07/09/20 0044     WBC 14.38 10*3/mm3      RBC 3.54 10*6/mm3      Hemoglobin 9.7 g/dL      Hematocrit 31.8 %      MCV 89.8 fL      MCH 27.4 pg      MCHC 30.5 g/dL      RDW 14.7 %      RDW-SD 47.8 fl      MPV 9.1 fL      Platelets 209 10*3/mm3     GTT 3 Hour [[id number]] Resulted:  05/01/20     Specimen:  Blood Updated:  07/09/20 0000     EXTGTT3 134    GTT Fasting [[id number]] Resulted:  05/01/20     Specimen:  Blood Updated:  07/09/20 0000     External Glucose, GTT - Fasting 97 mg/dL     GTT 1 Hour [[id number]] Resulted:  05/01/20     Specimen:  Blood Updated:  07/09/20 0000     External GTT 1 Hour 181    Chlamydia trachomatis, Neisseria gonorrhoeae, PCR w/ confirmation - Swab, Vagina [[id number]] Resulted:  01/15/20     Specimen:  Swab from Vagina Updated:  07/09/20 0000     External Chlamydia Screen Negative    Gonorrhea Screen - Swab, [[id number]] Resulted:  01/15/20     Specimen:  Swab Updated:  07/09/20 0000     External Gonorrhea Screen Negative    Group B Streptococcus Culture - Swab, Vaginal/Rectum [[id number]] Resulted:  06/23/20     Specimen:  Swab from Vaginal/Rectum Updated:  07/08/20 2343     External Strep Group B Ag Positive     Hepatitis B Surface Antigen [[id number]] Resulted:  01/15/20     Specimen:  Blood Updated:  07/08/20 2343     External Hepatitis B Surface Ag Negative    RPR [[id number]] Resulted:  01/15/20     Specimen:  Blood Updated:  07/08/20 2343     External RPR Non-Reactive    Rubella Antibody, IgG [[id number]] Resulted:  01/15/20     Specimen:  Blood Updated:  07/08/20 2343     External Rubella Qual Immune    HIV-1 Antibody, EIA [[id number]] Resulted:  01/15/20     Specimen:  Blood Updated:  07/08/20 2343     External HIV Antibody Non-Reactive          Imaging Results (All)     None        Orders (all)      Start     Ordered    07/10/20 0900  prenatal vitamin 27-0.8 tablet 1 tablet  Daily      07/09/20 0637 07/09/20 0637  hydrOXYzine (ATARAX) tablet 25 mg  3 Times Daily PRN      07/09/20 0637    07/09/20 0630  ropivacaine (NAROPIN) 0.2 % injection  Continuous      07/09/20 0530    07/09/20 0532  bupivacaine (PF) (MARCAINE) 0.25 % injection  - ADS Override Pull     Note to Pharmacy:  Created by cabinet override    07/09/20 0532    07/09/20 0530  Vital Signs Per Anesthesia Guidelines  Per Order Details     Comments:  Every 2 Minutes x5, Every 5 Minutes x4, Then If Stable Every 15 Minutes    07/09/20 0530    07/09/20 0530  Start IV with #16 or #18 gauge angiocath.  Once      07/09/20 0530    07/09/20 0530  Cardiac Monitoring  Continuous      07/09/20 0530    07/09/20 0530  Fetal Heart Rate Monitor  Once      07/09/20 0530    07/09/20 0530  Nurse or anesthesiologist to remain with patient for 15 minutes following dosing.  Until Discontinued      07/09/20 0530    07/09/20 0530  Facilitate maternal postion on side and maintain uterine displacement.  Until Discontinued      07/09/20 0530    07/09/20 0530  Consult anesthesia services prior to changing epidural infusion/rate.  Until Discontinued      07/09/20 0530    07/09/20 0530  Notify physician for the following conditions:  Until Discontinued      07/09/20 0530    07/09/20 0529   ePHEDrine injection 10 mg  Every 5 Minutes PRN      07/09/20 0530    07/09/20 0529  ondansetron (ZOFRAN) injection 4 mg  Once As Needed      07/09/20 0530    07/09/20 0529  famotidine (PEPCID) injection 20 mg  Once As Needed      07/09/20 0530    07/09/20 0423  ampicillin 1 g/100 mL 0.9% NS (MBP)  Every 4 Hours      07/08/20 2338    07/09/20 0205  butorphanol (STADOL) injection 1 mg  Every 2 Hours PRN      07/09/20 0206    07/09/20 0205  butorphanol (STADOL) injection 2 mg  Every 3 Hours PRN      07/09/20 0206    07/09/20 0030  sodium chloride 0.9 % flush 3 mL  Every 12 Hours Scheduled      07/08/20 2338    07/09/20 0030  lactated ringers infusion  Continuous      07/08/20 2338    07/09/20 0030  ampicillin 2 g/100 mL 0.9% NS (MBP)  Once      07/08/20 2338    07/09/20 0002  famotidine (PEPCID) injection 20 mg  Every 4 Hours PRN      07/09/20 0002    07/09/20 0000  Chlamydia trachomatis, Neisseria gonorrhoeae, PCR w/ confirmation - Swab, Vagina     Comments:  This is an external result entered through the Results Console.      07/09/20 0000    07/09/20 0000  Gonorrhea Screen - Swab,     Comments:  This is an external result entered through the Results Console.      07/09/20 0000    07/09/20 0000  GTT 3 Hour     Comments:  This is an external result entered through the Results Console.      07/09/20 0000    07/09/20 0000  GTT Fasting     Comments:  This is an external result entered through the Results Console.      07/09/20 0000    07/09/20 0000  GTT 1 Hour     Comments:  This is an external result entered through the Results Console.      07/09/20 0000    07/08/20 2338  VTE Prophylaxis Not Indicated: No Risk Factors (0); </= 3 (Low Risk)  Once      07/08/20 2338    07/08/20 2337  Admit To Obstetrics Inpatient  Once      07/08/20 2338    07/08/20 2337  Code Status and Medical Interventions:  Continuous      07/08/20 2338    07/08/20 2337  Obtain Informed Consent  Once      07/08/20 2338    07/08/20 2337  Vital Signs  Per Hospital Policy  Per Hospital Policy      20  Initiate Group Beta Strep (GBS) Prophylaxis Protocol, If Criteria Met  Continuous     Comments:  NO TREATMENT RECOMMENDED IF: 1) Maternal GBS Status Known Negative 2) Scheduled  Birth With Intact Membranes, Not in Labor 3) Maternal GBS Status Unknown, No Risk Factors  TREAT WITH ANTIBIOTICS IF:  1) Maternal GBS Status Known Positive 2) Maternal GBS Status Unknown With Risk Factors: a)  Previous Infant Affected By GBS Infection b) GBS Urinary Tract Infection (UTI) or Bacteriuria During Pregnancy c) Unexplained Maternal Fever (100.4F (38C) or Greater) During Labor d)  Prolonged Rupture of Membranes (18 or More Hours) e) Gestational Age Less Than 37 Weeks    20  Mini-Prep Prior to Delivery  Once      20  Continuous Fetal Monitoring With NST on Admission and Prior to Initiation of Oxytocin.  Per Order Details     Comments:  Continuous Fetal Monitoring With NST on Admission & Prior to Initiation of Oxytocin.    20  External Uterine Contraction Monitoring  Per Hospital Policy      20  Notify Provider (Specified)  Until Discontinued      20  Notify Provider of Tachysystole (Per Hospital Algorithm)  Until Discontinued      20  Notify Provider if Membranes Ruptured, Bleeding Greater Than 1 Pad Per Hour, Fetal Heart Tone Abnormality or Severe Pain  Until Discontinued      20  NPO Diet NPO Except: Ice Chips  Diet Effective Now      20  CBC (No Diff)  Once      20  Type & Screen  Once      20  Insert Peripheral IV  Once      20  Saline Lock & Maintain IV Access  Continuous      20  lidocaine PF 1% (XYLOCAINE)  injection 5 mL  As Needed      07/08/20 2338    07/08/20 2336  sodium chloride 0.9 % flush 3-10 mL  As Needed      07/08/20 2338    07/08/20 2336  lactated ringers bolus 1,000 mL  Once As Needed      07/08/20 2338    07/08/20 0000  Group B Streptococcus Culture - Swab, Vaginal/Rectum     Comments:  This is an external result entered through the Results Console.      07/08/20 2343    07/08/20 0000  Antibody Screen     Comments:  This is an external result entered through the Results Console.      07/08/20 2343    07/08/20 0000  ABO / Rh     Comments:  This is an external result entered through the Results Console.      07/08/20 2343    07/08/20 0000  Hepatitis B Surface Antigen     Comments:  This is an external result entered through the Results Console.      07/08/20 2343    07/08/20 0000  RPR     Comments:  This is an external result entered through the Results Console.      07/08/20 2343    07/08/20 0000  Rubella Antibody, IgG     Comments:  This is an external result entered through the Results Console.      07/08/20 2343    07/08/20 0000  HIV-1 Antibody, EIA     Comments:  This is an external result entered through the Results Console.      07/08/20 2343    Unscheduled  Position Change - For Intra-Uterine Resusitation for Hypertonus, HyperStimulation or Non-Reassuring Fetal Status  As Needed      07/08/20 2338    Signed and Held  Vital Signs Per Hospital Policy  Per Hospital Policy      Signed and Held    Signed and Held  Fundal & Lochia Check  As Needed     Comments:  Every 15 Minutes x4, Then Every 30 Minutes x2, Then Every Shift    Signed and Held    Signed and Held  Fundal & Lochia Check  Every Shift      Signed and Held    Signed and Held  Notify Provider (Specified)  Until Discontinued      Signed and Held    Signed and Held  Diet Regular  Diet Effective Now      Signed and Held    Signed and Held  oxytocin in sodium chloride (PITOCIN) 30 UNIT/500ML infusion solution  Once      Signed and Held    Signed  and Held  oxytocin in sodium chloride (PITOCIN) 30 UNIT/500ML infusion solution  Continuous      Signed and Held    Signed and Held  oxytocin in sodium chloride (PITOCIN) 30 UNIT/500ML infusion solution  Continuous PRN      Signed and Held    Signed and Held  methylergonovine (METHERGINE) injection 200 mcg  Once As Needed      Signed and Held    Signed and Held  carboprost (HEMABATE) injection 250 mcg  As Needed      Signed and Held    Signed and Held  miSOPROStol (CYTOTEC) tablet 800 mcg  As Needed      Signed and Held    --  famotidine (PEPCID) 20 MG tablet  2 Times Daily PRN      07/09/20 0124    --  Prenatal Vit-Fe Fumarate-FA (PRENATAL VITAMIN 27-0.8) 27-0.8 MG tablet tablet  Daily      07/09/20 0124    Signed and Held  Nurse may remove epidural catheter after delivery.  Until Discontinued      Signed and Held    Signed and Held  Transfer to postpartum when discharge criteria met.  Until Discontinued      Signed and Held                Consult Notes (all)    No notes of this type exist for this encounter.           FHR (since admission)     Date/Time Fetal HR Assessment Method Fetal HR (beats/min) Fetal Heart Baseline Rate Fetal HR Variability Fetal HR Accelerations Fetal HR Decelerations Fetal HR Tracing Category    07/09/20 0630  external  140  normal range  moderate (amplitude range 6 to 25 bpm)  greater than/equal to 15 bpm;lasting at least 15 seconds  absent  --    07/09/20 0600  external  130  normal range  moderate (amplitude range 6 to 25 bpm)  greater than/equal to 15 bpm;lasting at least 15 seconds  absent  --    07/09/20 0530  external  130  normal range  moderate (amplitude range 6 to 25 bpm)  greater than/equal to 15 bpm;lasting at least 15 seconds  absent  --    07/09/20 0500  external  135  normal range  moderate (amplitude range 6 to 25 bpm)  greater than/equal to 15 bpm  absent  --    07/09/20 0430  external  130  normal range  moderate (amplitude range 6 to 25 bpm)  greater than/equal to 15  bpm  variable  --    07/09/20 0400  external  135  normal range  moderate (amplitude range 6 to 25 bpm)  greater than/equal to 15 bpm  absent  --    07/09/20 0330  external  130  normal range  moderate (amplitude range 6 to 25 bpm)  greater than/equal to 15 bpm  absent  --    07/09/20 0300  external  135  normal range  moderate (amplitude range 6 to 25 bpm)  greater than/equal to 15 bpm  absent  --    07/09/20 0200  external  140  normal range  moderate (amplitude range 6 to 25 bpm)  greater than/equal to 15 bpm  absent  --    07/09/20 0130  external  150  normal range  moderate (amplitude range 6 to 25 bpm)  greater than/equal to 15 bpm  absent  --    07/09/20 0100  external  140  normal range  moderate (amplitude range 6 to 25 bpm)  greater than/equal to 15 bpm  early  --    07/09/20 0015  external  150  normal range  moderate (amplitude range 6 to 25 bpm)  greater than/equal to 15 bpm;lasting at least 15 seconds  absent  --        Uterine Activity (since admission)     Date/Time Method Contraction Frequency (Minutes) Contraction Duration (sec) Contraction Intensity Uterine Resting Tone Contraction Pattern    07/09/20 0630  palpation;per patient report;external tocotransducer  --  --  strong by palpation  soft by palpation  Irregular    07/09/20 0600  palpation;per patient report;external tocotransducer  2-3  --  strong by palpation  soft by palpation  --    07/09/20 0530  palpation;per patient report;external tocotransducer  2-3  --  moderate by palpation  soft by palpation  --    07/09/20 0500  palpation;per patient report;external tocotransducer  2-3  --  moderate by palpation  soft by palpation  Irregular    07/09/20 0430  palpation;per patient report;external tocotransducer  2-3  --  moderate by palpation  soft by palpation  --    07/09/20 0400  palpation;per patient report;external tocotransducer  2-3  --  moderate by palpation  soft by palpation  --    07/09/20 0330  palpation;per patient report;external  tocotransducer  3-4  --  moderate by palpation  soft by palpation  --    07/09/20 0300  palpation;per patient report;external tocotransducer  3-5  --  moderate by palpation  soft by palpation  --    07/09/20 0200  palpation;per patient report;external tocotransducer  3-5  --  mild by palpation  soft by palpation  --    07/09/20 0130  palpation;per patient report;external tocotransducer  4-5  --  mild by palpation  soft by palpation  Irregular    07/09/20 0100  palpation;per patient report;external tocotransducer  5-6  --  mild by palpation  soft by palpation  Irregular    07/09/20 0015  palpation;external tocotransducer  mild irregular contracitons  --  mild by palpation  soft by palpation  Irregular

## 2020-07-09 NOTE — PLAN OF CARE
Patient remains in stable condition SVE 8, 90%, 0 station no questions or concerns voiced this shift

## 2020-07-09 NOTE — ANESTHESIA PREPROCEDURE EVALUATION
Anesthesia Evaluation     Patient summary reviewed and Nursing notes reviewed   no history of anesthetic complications:  NPO Solid Status: > 8 hours  NPO Liquid Status: > 8 hours           Airway   Mallampati: II  TM distance: >3 FB  Neck ROM: full  No difficulty expected  Dental - normal exam     Pulmonary - normal exam   (+) asthma,  Cardiovascular - negative cardio ROS and normal exam  Exercise tolerance: good (4-7 METS)    NYHA Classification: II        Neuro/Psych- negative ROS  GI/Hepatic/Renal/Endo    (+)  GERD,  renal disease,     Musculoskeletal (-) negative ROS    Abdominal  - normal exam    Bowel sounds: normal.   Substance History - negative use     OB/GYN    (+) Pregnant,         Other - negative ROS                       Anesthesia Plan    ASA 2     epidural     intravenous induction     Anesthetic plan, all risks, benefits, and alternatives have been provided, discussed and informed consent has been obtained with: patient.

## 2020-07-09 NOTE — PLAN OF CARE
Problem: Patient Care Overview  Goal: Individualization and Mutuality  Outcome: Ongoing (interventions implemented as appropriate)  Note:   Patient doing well, vital signs stable, lochia light with no odor or clots, patient voiding well and ambulating in room, will continue postpartum care

## 2020-07-09 NOTE — H&P
Chief complaint   Chief Complaint   Patient presents with   • Leaking Fluid     mecconium   • Abdominal Cramping       History of Present Illness: Patient is a 30 y.o. female who presents with IUP at 38w5d weeks gestation. G 2, P 1.       Past Medical History:   Diagnosis Date   • Anxiety    • Asthma    • Chronic kidney disease    • GERD (gastroesophageal reflux disease)         Past Surgical History:   Procedure Laterality Date   • HERNIA REPAIR  2016   • KNEE SURGERY Left    • RENAL BIOPSY     • TONSILLECTOMY     • UMBILICAL HERNIA REPAIR     • WISDOM TOOTH EXTRACTION       Family History   Problem Relation Age of Onset   • Alcohol abuse Father    • Hypertension Father    • Melanoma Father    • Coronary artery disease Mother    • Depression Mother    • Hypertension Mother    • Thyroid disease Mother    • Alcohol abuse Brother    • Diabetes Brother    • Hypertension Brother    • Coronary artery disease Maternal Grandfather      Social History     Tobacco Use   • Smoking status: Former Smoker   • Smokeless tobacco: Never Used   Substance Use Topics   • Alcohol use: Never     Frequency: Never   • Drug use: Never     Medications Prior to Admission   Medication Sig Dispense Refill Last Dose   • famotidine (PEPCID) 20 MG tablet Take 20 mg by mouth 2 (Two) Times a Day As Needed for Indigestion or Heartburn.   7/8/2020 at Unknown time   • Prenatal Vit-Fe Fumarate-FA (PRENATAL VITAMIN 27-0.8) 27-0.8 MG tablet tablet Take 1 tablet by mouth Daily.   Past Week at Unknown time   • hydrOXYzine pamoate (Vistaril) 25 MG capsule Take 1 capsule by mouth 3 (Three) Times a Day As Needed for Itching. 60 capsule 0 Unknown at Unknown time     Allergies:  Patient has no known allergies.      Vital Signs  Temp:  [97.5 °F (36.4 °C)-99 °F (37.2 °C)] 99 °F (37.2 °C)  Heart Rate:  [] 112  Resp:  [20] 20  BP: (118-141)/(62-81) 130/77    Radiology  Imaging Results (Last 24 Hours)     ** No results found for the last 24 hours. **           Labs  Lab Results (last 24 hours)     Procedure Component Value Units Date/Time    CBC (No Diff) [[id number]]  (Abnormal) Collected:  07/09/20 0036    Specimen:  Blood Updated:  07/09/20 0044     WBC 14.38 10*3/mm3      RBC 3.54 10*6/mm3      Hemoglobin 9.7 g/dL      Hematocrit 31.8 %      MCV 89.8 fL      MCH 27.4 pg      MCHC 30.5 g/dL      RDW 14.7 %      RDW-SD 47.8 fl      MPV 9.1 fL      Platelets 209 10*3/mm3     GTT 3 Hour [[id number]] Resulted:  05/01/20     Specimen:  Blood Updated:  07/09/20 0000     EXTGTT3 134    GTT Fasting [[id number]] Resulted:  05/01/20     Specimen:  Blood Updated:  07/09/20 0000     External Glucose, GTT - Fasting 97 mg/dL     GTT 1 Hour [[id number]] Resulted:  05/01/20     Specimen:  Blood Updated:  07/09/20 0000     External GTT 1 Hour 181    Chlamydia trachomatis, Neisseria gonorrhoeae, PCR w/ confirmation - Swab, Vagina [[id number]] Resulted:  01/15/20     Specimen:  Swab from Vagina Updated:  07/09/20 0000     External Chlamydia Screen Negative    Gonorrhea Screen - Swab, [[id number]] Resulted:  01/15/20     Specimen:  Swab Updated:  07/09/20 0000     External Gonorrhea Screen Negative    Group B Streptococcus Culture - Swab, Vaginal/Rectum [[id number]] Resulted:  06/23/20     Specimen:  Swab from Vaginal/Rectum Updated:  07/08/20 2343     External Strep Group B Ag Positive    Hepatitis B Surface Antigen [[id number]] Resulted:  01/15/20     Specimen:  Blood Updated:  07/08/20 2343     External Hepatitis B Surface Ag Negative    RPR [[id number]] Resulted:  01/15/20     Specimen:  Blood Updated:  07/08/20 2343     External RPR Non-Reactive    Rubella Antibody, IgG [[id number]] Resulted:  01/15/20     Specimen:  Blood Updated:  07/08/20 2343     External Rubella Qual Immune    HIV-1 Antibody, EIA [[id number]] Resulted:  01/15/20     Specimen:  Blood Updated:  07/08/20 2343     External HIV Antibody Non-Reactive            Review of Systems    The following systems were reviewed and  negative;  ENT, respiratory, cardiovascular, gastrointestinal, genitourinary, breast, endocrine and allergies / immunologic.      Physical Exam:      General Appearance:    Alert, cooperative, in no acute distress   Head:    Normocephalic, without obvious abnormality, atraumatic   Eyes:            Lids and lashes normal, conjunctivae and sclerae normal, no   icterus, no pallor, corneas clear, PERRLA   Ears:    Ears appear intact with no abnormalities noted   Throat:   No oral lesions, no thrush, oral mucosa moist   Neck:   No adenopathy, supple, trachea midline, no thyromegaly, no     carotid bruit, no JVD   Back:     No kyphosis present, no scoliosis present, no skin lesions,       erythema or scars, no tenderness to percussion or                   palpation,   range of motion normal   Lungs:     Clear to auscultation,respirations regular, even and                   unlabored    Heart:    Regular rhythm and normal rate, normal S1 and S2, no            murmur, no gallop, no rub, no click   Breast Exam:    Deferred   Abdomen:     Normal bowel sounds, no masses, no organomegaly, soft        non-tender, non-distended, no guarding, no rebound                 tenderness   Genitalia:    Cervix: Dilated 6 cm, 90%   Extremities:   Moves all extremities well, no edema, no cyanosis, no              redness   Pulses:   Pulses palpable and equal bilaterally   Skin:   No bleeding, bruising or rash   Lymph nodes:   No palpable adenopathy   Neurologic:   Cranial nerves 2 - 12 grossly intact, sensation intact, DTR        present and equal bilaterally         Assessment: Patient is a 30 y.o. female who presents with IUP at 38w5d weeks gestation. G 2 P1. Ruptured membranes. Active labor.  Chief Complaint   Patient presents with   • Leaking Fluid     mecconium   • Abdominal Cramping       Plan of Care: Admit. Anticipate delivery.      Wm Perez III, MD  07/09/20  08:55

## 2020-07-09 NOTE — NURSING NOTE
New admit to room 228 to the services of Dr. Simmons, G2,P1 @ 38 4/7 weeks gestation with complaints of leaking of fluid meconium stained, and abdominal cramping, denies any VB and has +FM

## 2020-07-10 LAB
BASOPHILS # BLD AUTO: 0.1 10*3/MM3 (ref 0–0.2)
BASOPHILS NFR BLD AUTO: 0.8 % (ref 0–1.5)
DEPRECATED RDW RBC AUTO: 47.5 FL (ref 37–54)
EOSINOPHIL # BLD AUTO: 0.8 10*3/MM3 (ref 0–0.4)
EOSINOPHIL NFR BLD AUTO: 6 % (ref 0.3–6.2)
ERYTHROCYTE [DISTWIDTH] IN BLOOD BY AUTOMATED COUNT: 14.8 % (ref 12.3–15.4)
HCT VFR BLD AUTO: 30.3 % (ref 34–46.6)
HGB BLD-MCNC: 9.2 G/DL (ref 12–15.9)
IMM GRANULOCYTES # BLD AUTO: 0.16 10*3/MM3 (ref 0–0.05)
IMM GRANULOCYTES NFR BLD AUTO: 1.2 % (ref 0–0.5)
LYMPHOCYTES # BLD AUTO: 2.62 10*3/MM3 (ref 0.7–3.1)
LYMPHOCYTES NFR BLD AUTO: 19.7 % (ref 19.6–45.3)
MCH RBC QN AUTO: 27.1 PG (ref 26.6–33)
MCHC RBC AUTO-ENTMCNC: 30.4 G/DL (ref 31.5–35.7)
MCV RBC AUTO: 89.1 FL (ref 79–97)
MONOCYTES # BLD AUTO: 1.1 10*3/MM3 (ref 0.1–0.9)
MONOCYTES NFR BLD AUTO: 8.3 % (ref 5–12)
NEUTROPHILS NFR BLD AUTO: 64 % (ref 42.7–76)
NEUTROPHILS NFR BLD AUTO: 8.55 10*3/MM3 (ref 1.7–7)
NRBC BLD AUTO-RTO: 0 /100 WBC (ref 0–0.2)
PLATELET # BLD AUTO: 203 10*3/MM3 (ref 140–450)
PMV BLD AUTO: 9.5 FL (ref 6–12)
RBC # BLD AUTO: 3.4 10*6/MM3 (ref 3.77–5.28)
WBC # BLD AUTO: 13.33 10*3/MM3 (ref 3.4–10.8)

## 2020-07-10 PROCEDURE — 85025 COMPLETE CBC W/AUTO DIFF WBC: CPT | Performed by: OBSTETRICS & GYNECOLOGY

## 2020-07-10 RX ADMIN — DOCUSATE SODIUM 100 MG: 100 CAPSULE ORAL at 21:11

## 2020-07-10 RX ADMIN — FAMOTIDINE 20 MG: 20 TABLET ORAL at 08:18

## 2020-07-10 RX ADMIN — Medication 1 TABLET: at 08:18

## 2020-07-10 RX ADMIN — ACETAMINOPHEN 1000 MG: 500 TABLET ORAL at 21:10

## 2020-07-10 RX ADMIN — DOCUSATE SODIUM 100 MG: 100 CAPSULE ORAL at 08:18

## 2020-07-10 RX ADMIN — Medication: at 22:34

## 2020-07-10 NOTE — PLAN OF CARE
Vital signs stable, no signs of distress noted. Pain controlled on current pain regiment, bleeding WNL, fundus firm without.

## 2020-07-10 NOTE — PLAN OF CARE
Problem: Patient Care Overview  Goal: Individualization and Mutuality  Outcome: Ongoing (interventions implemented as appropriate)  Goal: Discharge Needs Assessment  Outcome: Ongoing (interventions implemented as appropriate)  Goal: Interprofessional Rounds/Family Conf  Outcome: Ongoing (interventions implemented as appropriate)     Problem: Postpartum (Vaginal Delivery) (Adult,Obstetrics,Pediatric)  Goal: Signs and Symptoms of Listed Potential Problems Will be Absent, Minimized or Managed (Postpartum)  Outcome: Ongoing (interventions implemented as appropriate)  Flowsheets (Taken 7/10/2020 0550 by Shannen Bobo RN)  Problems Assessed (Postpartum Vaginal Delivery): all  Problems Present (Postpartum Vag Deliv): none  Note:   Patient is doing well, firm, small lochia     Problem: Breastfeeding (Adult,Obstetrics,Pediatric)  Goal: Signs and Symptoms of Listed Potential Problems Will be Absent, Minimized or Managed (Breastfeeding)  Outcome: Ongoing (interventions implemented as appropriate)  Flowsheets (Taken 7/10/2020 1900)  Problems Assessed (Breastfeeding): all  Problems Present (Breastfeeding): none

## 2020-07-10 NOTE — PROGRESS NOTES
Spontaneous Vaginal Delivery Progress Note      Hospital Course: G 2, now P2. Patient was admitted on 7/8/2020 11:30 PM with IUP at 38w5d weeks gestation secondary to Amniotic fluid leaking [O42.90]. Patient underwent a normal spontaneous vaginal delivery.       Patient stable. No complaints.     signs in last 24 hours:    Vital Signs Range for the last 24 hours              Temp:  [97.3 °F (36.3 °C)-99.3 °F (37.4 °C)] 99.3 °F (37.4 °C)  Heart Rate:  [] 94  Resp:  [18] 18  BP: (109-126)/(66-79) 126/79     Radiology     Imaging Results (Last 24 Hours)     ** No results found for the last 24 hours. **           Labs     Lab Results (last 24 hours)     Procedure Component Value Units Date/Time    CBC & Differential [[id number]] Collected:  07/10/20 0644    Specimen:  Blood Updated:  07/10/20 0723    Narrative:       The following orders were created for panel order CBC & Differential.  Procedure                               Abnormality         Status                     ---------                               -----------         ------                     CBC Auto Differential[[id number]]        Abnormal            Final result                 Please view results for these tests on the individual orders.    CBC Auto Differential [[id number]]  (Abnormal) Collected:  07/10/20 0644    Specimen:  Blood Updated:  07/10/20 0723     WBC 13.33 10*3/mm3      RBC 3.40 10*6/mm3      Hemoglobin 9.2 g/dL      Hematocrit 30.3 %      MCV 89.1 fL      MCH 27.1 pg      MCHC 30.4 g/dL      RDW 14.8 %      RDW-SD 47.5 fl      MPV 9.5 fL      Platelets 203 10*3/mm3      Neutrophil % 64.0 %      Lymphocyte % 19.7 %      Monocyte % 8.3 %      Eosinophil % 6.0 %      Basophil % 0.8 %      Immature Grans % 1.2 %      Neutrophils, Absolute 8.55 10*3/mm3      Lymphocytes, Absolute 2.62 10*3/mm3      Monocytes, Absolute 1.10 10*3/mm3      Eosinophils, Absolute 0.80 10*3/mm3      Basophils, Absolute 0.10 10*3/mm3      Immature Grans,  Absolute 0.16 10*3/mm3      nRBC 0.0 /100 WBC             Review of Systems    The following systems were reviewed and negative;  ENT, respiratory, cardiovascular, gastrointestinal, genitourinary, breast, endocrine and allergies / immunologic.      Physical Exam:      General Appearance:    Alert, cooperative, in no acute distress   Head:    Normocephalic, without obvious abnormality, atraumatic   Eyes:            Lids and lashes normal, conjunctivae and sclerae normal, no   icterus, no pallor, corneas clear, PERRLA   Ears:    Ears appear intact with no abnormalities noted   Throat:   No oral lesions, no thrush, oral mucosa moist   Neck:   No adenopathy, supple, trachea midline, no thyromegaly, no     carotid bruit, no JVD   Back:     No kyphosis present, no scoliosis present, no skin lesions,       erythema or scars, no tenderness to percussion or                   palpation,   range of motion normal   Lungs:     Clear to auscultation,respirations regular, even and                   unlabored    Heart:    Regular rhythm and normal rate, normal S1 and S2, no            murmur, no gallop, no rub, no click   Breast Exam:    Deferred   Abdomen:     Normal bowel sounds, no masses, no organomegaly, soft        non-tender, non-distended, no guarding, no rebound                 tenderness   Genitalia:    Deferred   Extremities:   Moves all extremities well, no edema, no cyanosis, no              redness   Pulses:   Pulses palpable and equal bilaterally   Skin:   No bleeding, bruising or rash   Lymph nodes:   No palpable adenopathy   Neurologic:   Cranial nerves 2 - 12 grossly intact, sensation intact, DTR        present and equal bilaterally                 Assessment:  1.  . PPD#1. Patient doing well. No complaints.     Plan:  1. Will continue current plan of care and anticipate discharge to home in the AM.      Wm Perez III, MD  07/10/20  07:54

## 2020-07-11 VITALS
SYSTOLIC BLOOD PRESSURE: 122 MMHG | WEIGHT: 208 LBS | HEART RATE: 95 BPM | RESPIRATION RATE: 18 BRPM | TEMPERATURE: 97.9 F | HEIGHT: 64 IN | BODY MASS INDEX: 35.51 KG/M2 | DIASTOLIC BLOOD PRESSURE: 82 MMHG | OXYGEN SATURATION: 99 %

## 2020-07-11 PROBLEM — O42.90 AMNIOTIC FLUID LEAKING: Status: RESOLVED | Noted: 2020-07-08 | Resolved: 2020-07-11

## 2020-07-11 PROBLEM — Z36.89 NON-STRESS TEST REACTIVE: Status: RESOLVED | Noted: 2020-07-06 | Resolved: 2020-07-11

## 2020-07-11 RX ORDER — IBUPROFEN 800 MG/1
800 TABLET ORAL EVERY 6 HOURS PRN
Qty: 30 TABLET | Refills: 0 | Status: SHIPPED | OUTPATIENT
Start: 2020-07-11 | End: 2021-11-19 | Stop reason: SDUPTHER

## 2020-07-11 RX ADMIN — FAMOTIDINE 20 MG: 20 TABLET ORAL at 08:24

## 2020-07-11 RX ADMIN — Medication 1 TABLET: at 08:24

## 2020-07-11 RX ADMIN — DOCUSATE SODIUM 100 MG: 100 CAPSULE ORAL at 08:24

## 2020-07-11 NOTE — PLAN OF CARE
Problem: Patient Care Overview  Goal: Individualization and Mutuality  Outcome: Outcome(s) achieved  Goal: Discharge Needs Assessment  Outcome: Outcome(s) achieved  Goal: Interprofessional Rounds/Family Conf  Outcome: Outcome(s) achieved     Problem: Postpartum (Vaginal Delivery) (Adult,Obstetrics,Pediatric)  Goal: Signs and Symptoms of Listed Potential Problems Will be Absent, Minimized or Managed (Postpartum)  Outcome: Outcome(s) achieved  Flowsheets (Taken 7/10/2020 0550 by Shannen Bobo RN)  Problems Assessed (Postpartum Vaginal Delivery): all  Problems Present (Postpartum Vag Deliv): none  Note:   Patient is doing well, firm, small lochia, being discharged home in stable conditions.     Problem: Breastfeeding (Adult,Obstetrics,Pediatric)  Goal: Signs and Symptoms of Listed Potential Problems Will be Absent, Minimized or Managed (Breastfeeding)  Outcome: Outcome(s) achieved  Flowsheets (Taken 7/10/2020 1900)  Problems Assessed (Breastfeeding): all

## 2020-07-11 NOTE — DISCHARGE SUMMARY
Discharge Summary: Vaginal Delivery     Admit Date: 2020 11:30 PM    Admit Diagnosis: Amniotic fluid leaking [O42.90]    Date of Discharge:  2020    Discharge Diagnosis: Same        Hospital Course  Patient is a 30 y.o. female, G 2, now P 2. S/P . PPD#2. Patient doing well. No complaints. Patient tolerating a regular diet and ambulating without difficulty. Will discharge to home today.     Procedures Performed  Normal Spontaneous Vaginal Delivery         Vital Signs  Temp:  [97.9 °F (36.6 °C)-98 °F (36.7 °C)] 97.9 °F (36.6 °C)  Heart Rate:  [92-95] 95  Resp:  [18] 18  BP: (122-132)/(82-83) 122/82    Review of Systems    The following systems were reviewed and negative;  ENT, respiratory, cardiovascular, gastrointestinal, genitourinary, breast, endocrine and allergies / immunologic.      Physical Exam:      General Appearance:    Alert, cooperative, in no acute distress   Head:    Normocephalic, without obvious abnormality, atraumatic   Eyes:            Lids and lashes normal, conjunctivae and sclerae normal, no   icterus, no pallor, corneas clear, PERRLA   Ears:    Ears appear intact with no abnormalities noted   Throat:   No oral lesions, no thrush, oral mucosa moist   Neck:   No adenopathy, supple, trachea midline, no thyromegaly, no     carotid bruit, no JVD   Back:     No kyphosis present, no scoliosis present, no skin lesions,       erythema or scars, no tenderness to percussion or                   palpation,   range of motion normal   Lungs:     Clear to auscultation,respirations regular, even and                   unlabored    Heart:    Regular rhythm and normal rate, normal S1 and S2, no            murmur, no gallop, no rub, no click   Breast Exam:    Deferred   Abdomen:     Normal bowel sounds, no masses, no organomegaly, soft        non-tender, non-distended, no guarding, no rebound                 tenderness   Genitalia:    Deferred   Extremities:   Moves all extremities well, no edema, no  cyanosis, no              redness   Pulses:   Pulses palpable and equal bilaterally   Skin:   No bleeding, bruising or rash   Lymph nodes:   No palpable adenopathy   Neurologic:   Cranial nerves 2 - 12 grossly intact, sensation intact, DTR        present and equal bilaterally             Condition on Discharge:  Stable    Urine Output Good    Discharge Diet: Regular    Discharge Medications  Motrin 800 mg q 6 #30    Activity at Discharge: No driving x 2 weeks. Nothing per vagina until cleared by a physician. Patient expected to return to work or school in 6 weeks.     Follow-up Appointments  Patient will follow up with Dr. Perez in 2 weeks.        Wm Perez MD.   07/11/20  08:58

## 2020-10-15 ENCOUNTER — TRANSCRIBE ORDERS (OUTPATIENT)
Dept: ADMINISTRATIVE | Facility: HOSPITAL | Age: 30
End: 2020-10-15

## 2020-10-15 ENCOUNTER — HOSPITAL ENCOUNTER (OUTPATIENT)
Dept: ULTRASOUND IMAGING | Facility: HOSPITAL | Age: 30
Discharge: HOME | End: 2020-10-15
Admitting: PHYSICIAN ASSISTANT

## 2020-10-15 DIAGNOSIS — R10.9 ABDOMINAL PAIN, UNSPECIFIED ABDOMINAL LOCATION: Primary | ICD-10-CM

## 2020-10-15 DIAGNOSIS — R10.9 ABDOMINAL PAIN, UNSPECIFIED ABDOMINAL LOCATION: ICD-10-CM

## 2020-10-15 PROCEDURE — 76700 US EXAM ABDOM COMPLETE: CPT | Performed by: RADIOLOGY

## 2020-10-15 PROCEDURE — 76700 US EXAM ABDOM COMPLETE: CPT

## 2021-03-18 ENCOUNTER — BULK ORDERING (OUTPATIENT)
Dept: CASE MANAGEMENT | Facility: OTHER | Age: 31
End: 2021-03-18

## 2021-03-18 DIAGNOSIS — Z23 IMMUNIZATION DUE: ICD-10-CM

## 2021-09-08 ENCOUNTER — IMMUNIZATION (OUTPATIENT)
Dept: VACCINE CLINIC | Facility: HOSPITAL | Age: 31
End: 2021-09-08

## 2021-09-08 PROCEDURE — 91300 HC SARSCOV02 VAC 30MCG/0.3ML IM: CPT | Performed by: INTERNAL MEDICINE

## 2021-09-08 PROCEDURE — 0001A: CPT | Performed by: INTERNAL MEDICINE

## 2021-09-20 DIAGNOSIS — M54.50 LOW BACK PAIN, UNSPECIFIED BACK PAIN LATERALITY, UNSPECIFIED CHRONICITY, UNSPECIFIED WHETHER SCIATICA PRESENT: Primary | ICD-10-CM

## 2021-09-21 ENCOUNTER — OFFICE VISIT (OUTPATIENT)
Dept: ORTHOPEDIC SURGERY | Facility: CLINIC | Age: 31
End: 2021-09-21

## 2021-09-21 ENCOUNTER — HOSPITAL ENCOUNTER (OUTPATIENT)
Dept: GENERAL RADIOLOGY | Facility: HOSPITAL | Age: 31
Discharge: HOME | End: 2021-09-21
Admitting: PHYSICIAN ASSISTANT

## 2021-09-21 VITALS
DIASTOLIC BLOOD PRESSURE: 83 MMHG | WEIGHT: 167 LBS | BODY MASS INDEX: 28.51 KG/M2 | SYSTOLIC BLOOD PRESSURE: 122 MMHG | HEIGHT: 64 IN | HEART RATE: 84 BPM

## 2021-09-21 DIAGNOSIS — M54.50 LOW BACK PAIN, UNSPECIFIED BACK PAIN LATERALITY, UNSPECIFIED CHRONICITY, UNSPECIFIED WHETHER SCIATICA PRESENT: ICD-10-CM

## 2021-09-21 DIAGNOSIS — M54.50 ACUTE RIGHT-SIDED LOW BACK PAIN WITHOUT SCIATICA: Primary | ICD-10-CM

## 2021-09-21 PROCEDURE — 99203 OFFICE O/P NEW LOW 30 MIN: CPT | Performed by: PHYSICIAN ASSISTANT

## 2021-09-21 PROCEDURE — 72100 X-RAY EXAM L-S SPINE 2/3 VWS: CPT | Performed by: RADIOLOGY

## 2021-09-21 PROCEDURE — 72100 X-RAY EXAM L-S SPINE 2/3 VWS: CPT

## 2021-09-21 RX ORDER — SULFAMETHOXAZOLE AND TRIMETHOPRIM 800; 160 MG/1; MG/1
TABLET ORAL
COMMUNITY
Start: 2021-06-30 | End: 2021-11-24

## 2021-09-21 RX ORDER — METHYLPREDNISOLONE 4 MG/1
TABLET ORAL
Qty: 21 TABLET | Refills: 0 | Status: SHIPPED | OUTPATIENT
Start: 2021-09-21

## 2021-09-21 NOTE — PROGRESS NOTES
[facility] [place] [facility] New Patient Visit          Patient: Ysabel Barrios  YOB: 1990  Date of Encounter: 09/21/2021  PCP: Renea Garcia PA-C      Subjective     Chief Complaint   Patient presents with   • Lumbar Spine - Pain, Initial Evaluation           History of Present Illness:     Ysabel Barrios is a 31 y.o. female presents today secondary to a 2-week history of abrupt onset lower back pain radiating to the right buttock.  The patient reports no progressive radiation past this region.  She has pain to the lower lumbar and posterior SI joints bilaterally.  Patient states that she had issues with sciatica and lower lumbar radiculopathy during pregnancy over youngest child who is now 1 year of age.  Patient does not recall specific injury however felt as if she may have slept on the joint.  She has pain dull throbbing aching sensation while at rest difficulty when getting up from a seated position or carrying her child after prolonged inactivity.  Patient has implemented conservative treatment options including relative rest, activity modification, OTC NSAIDs, etc.        Patient Active Problem List   Diagnosis   (none) - all problems resolved or deleted     Past Medical History:   Diagnosis Date   • Anxiety    • Asthma    • Chronic kidney disease    • GERD (gastroesophageal reflux disease)      Past Surgical History:   Procedure Laterality Date   • HERNIA REPAIR  2016   • KNEE SURGERY Left    • RENAL BIOPSY     • TONSILLECTOMY     • UMBILICAL HERNIA REPAIR     • WISDOM TOOTH EXTRACTION       Social History     Occupational History   • Not on file   Tobacco Use   • Smoking status: Former Smoker   • Smokeless tobacco: Never Used   Substance and Sexual Activity   • Alcohol use: Never   • Drug use: Never   • Sexual activity: Yes     Partners: Male    Ysabel Barrios  reports that she has quit smoking. She has never used smokeless tobacco.. I have educated her on the risk of diseases  from using tobacco products such as cancer, COPD and heart disease.          Social History     Social History Narrative   • Not on file     Family History   Problem Relation Age of Onset   • Alcohol abuse Father    • Hypertension Father    • Melanoma Father    • Coronary artery disease Mother    • Depression Mother    • Hypertension Mother    • Thyroid disease Mother    • Alcohol abuse Brother    • Diabetes Brother    • Hypertension Brother    • Coronary artery disease Maternal Grandfather      Current Outpatient Medications   Medication Sig Dispense Refill   • Dulera 100-5 MCG/ACT inhaler      • sulfamethoxazole-trimethoprim (BACTRIM DS,SEPTRA DS) 800-160 MG per tablet      • famotidine (PEPCID) 20 MG tablet Take 20 mg by mouth 2 (Two) Times a Day As Needed for Indigestion or Heartburn.     • hydrOXYzine pamoate (Vistaril) 25 MG capsule Take 1 capsule by mouth 3 (Three) Times a Day As Needed for Itching. 60 capsule 0   • ibuprofen (ADVIL,MOTRIN) 800 MG tablet Take 1 tablet by mouth Every 6 (Six) Hours As Needed (as needed). 30 tablet 0   • methylPREDNISolone (MEDROL) 4 MG dose pack Use as directed by package instructions 21 tablet 0   • Prenatal Vit-Fe Fumarate-FA (PRENATAL VITAMIN 27-0.8) 27-0.8 MG tablet tablet Take 1 tablet by mouth Daily.       No current facility-administered medications for this visit.     No Known Allergies         Review of Systems   Constitutional: Negative.   HENT: Negative.    Eyes: Negative.    Cardiovascular: Negative.    Respiratory: Negative.    Endocrine: Negative.    Hematologic/Lymphatic: Negative.    Skin: Negative.    Musculoskeletal: Positive for back pain.        Pertinent positives listed in HPI   Gastrointestinal: Negative.    Genitourinary: Negative.    Neurological: Negative.    Psychiatric/Behavioral: The patient is nervous/anxious.    Allergic/Immunologic: Negative.          Objective      Vitals:    09/21/21 0833   BP: 122/83   Pulse: 84   Weight: 75.8 kg (167 lb)  "  Height: 162.6 cm (64.02\")      Patient's Body mass index is 28.65 kg/m². indicating that she is overweight (BMI 25-29.9). Obesity-related health conditions include the following: listed in PMH . Obesity is improving with lifestyle modifications. BMI is is above average; BMI management plan is completed. Discussion was held the patient as she is postpartum and breast-feeding and this BMI level is decreasing to her normal baseline.      Physical Exam  Vitals and nursing note reviewed.   Constitutional:       General: She is not in acute distress.     Appearance: She is not ill-appearing.   HENT:      Head: Normocephalic and atraumatic.      Right Ear: External ear normal.      Left Ear: External ear normal.      Nose: Nose normal. No congestion or rhinorrhea.   Eyes:      Extraocular Movements: Extraocular movements intact.      Conjunctiva/sclera: Conjunctivae normal.      Pupils: Pupils are equal, round, and reactive to light.   Cardiovascular:      Rate and Rhythm: Normal rate.      Pulses: Normal pulses.   Pulmonary:      Effort: Pulmonary effort is normal. No respiratory distress.      Breath sounds: No stridor.   Abdominal:      General: There is no distension.   Musculoskeletal:      Cervical back: Normal range of motion.      Lumbar back: Spasms and tenderness (bilateral SI joint R>L) present. No swelling, edema, deformity, signs of trauma or lacerations. Decreased range of motion: pain inhibition. Negative right straight leg raise test and negative left straight leg raise test. No scoliosis.      Comments: EHL/FHL intact.  Patellar Achilles reflexes intact.  Pain with lateral rotation to the left.  Bilateral hips normal hip rotation with no pain with hip flexion.  Angela's test negative bilaterally   Skin:     General: Skin is warm and dry.      Capillary Refill: Capillary refill takes less than 2 seconds.   Neurological:      General: No focal deficit present.      Mental Status: She is alert and oriented " to person, place, and time.   Psychiatric:         Mood and Affect: Mood normal.         Behavior: Behavior normal.         Thought Content: Thought content normal.         Judgment: Judgment normal.             Radiology:      XR Spine Lumbar 2 or 3 View    Result Date: 9/21/2021  Very mild degenerative disc change at the level of L4-5.           Assessment/Plan        ICD-10-CM ICD-9-CM   1. Acute right-sided low back pain without sciatica  M54.5 724.2         31-year-old female with notable very mild degenerative disc change L4/5 with acute right-sided lower lumbar and SI joint pain without radiculopathy.  The patient will implement conservative treatment options with continuation of sciatica stretches that she has been completing at home as well as Medrol Dosepak to be taken as directed.  The patient return back in 2 weeks in which we will discuss potential for formal outpatient physical therapy.  Discussions have the patient and given the SI joint moment there is potential for localized SI joint injections versus other modalities.  We will continue to monitor closely for any significant changes.                    This document was signed by Silvestre Carney PA-C September 21, 2021     CC: Renea Garcia PA-C       EMR Dragon/Transcription disclaimer:  Part of this note may be completed utilizing the dragon speech recognition software. This electronic transcription/translation of spoken language to printed text may contain grammatical errors, random word insertions, pronoun errors, and incomplete sentences or occasional consequences of the system due to software limitations, ambient noise, and hardware issues.  Any questions or concerns about the content, text, or information contained within the body of this dictation should be directly addressed to the physician for clarification.

## 2021-10-19 ENCOUNTER — IMMUNIZATION (OUTPATIENT)
Dept: VACCINE CLINIC | Facility: HOSPITAL | Age: 31
End: 2021-10-19

## 2021-10-19 PROCEDURE — 0002A: CPT | Performed by: INTERNAL MEDICINE

## 2021-10-19 PROCEDURE — 91300 HC SARSCOV02 VAC 30MCG/0.3ML IM: CPT | Performed by: INTERNAL MEDICINE

## 2021-11-19 RX ORDER — IBUPROFEN 800 MG/1
800 TABLET ORAL 3 TIMES DAILY
Qty: 90 TABLET | Refills: 0 | Status: SHIPPED | OUTPATIENT
Start: 2021-11-19

## 2021-11-24 RX ORDER — SULFAMETHOXAZOLE AND TRIMETHOPRIM 800; 160 MG/1; MG/1
1 TABLET ORAL 2 TIMES DAILY
Qty: 20 TABLET | Refills: 0 | Status: SHIPPED | OUTPATIENT
Start: 2021-11-24 | End: 2021-12-04

## 2023-03-29 ENCOUNTER — LAB (OUTPATIENT)
Dept: LAB | Facility: HOSPITAL | Age: 33
End: 2023-03-29
Payer: COMMERCIAL

## 2023-03-29 ENCOUNTER — TRANSCRIBE ORDERS (OUTPATIENT)
Dept: ADMINISTRATIVE | Facility: HOSPITAL | Age: 33
End: 2023-03-29
Payer: COMMERCIAL

## 2023-03-29 DIAGNOSIS — E55.9 VITAMIN D DEFICIENCY: ICD-10-CM

## 2023-03-29 DIAGNOSIS — R53.83 OTHER FATIGUE: ICD-10-CM

## 2023-03-29 DIAGNOSIS — R63.5 WEIGHT GAIN: Primary | ICD-10-CM

## 2023-03-29 DIAGNOSIS — R63.5 WEIGHT GAIN: ICD-10-CM

## 2023-03-29 PROCEDURE — 80061 LIPID PANEL: CPT

## 2023-03-29 PROCEDURE — 83525 ASSAY OF INSULIN: CPT

## 2023-03-29 PROCEDURE — 82607 VITAMIN B-12: CPT

## 2023-03-29 PROCEDURE — 80053 COMPREHEN METABOLIC PANEL: CPT

## 2023-03-29 PROCEDURE — 84443 ASSAY THYROID STIM HORMONE: CPT

## 2023-03-29 PROCEDURE — 83036 HEMOGLOBIN GLYCOSYLATED A1C: CPT

## 2023-03-29 PROCEDURE — 85025 COMPLETE CBC W/AUTO DIFF WBC: CPT

## 2023-03-29 PROCEDURE — 36415 COLL VENOUS BLD VENIPUNCTURE: CPT

## 2023-03-29 PROCEDURE — 82306 VITAMIN D 25 HYDROXY: CPT

## 2023-03-30 LAB
25(OH)D3 SERPL-MCNC: 27.2 NG/ML (ref 30–100)
ALBUMIN SERPL-MCNC: 3.9 G/DL (ref 3.5–5.2)
ALBUMIN/GLOB SERPL: 1.3 G/DL
ALP SERPL-CCNC: 65 U/L (ref 39–117)
ALT SERPL W P-5'-P-CCNC: 11 U/L (ref 1–33)
ANION GAP SERPL CALCULATED.3IONS-SCNC: 10 MMOL/L (ref 5–15)
AST SERPL-CCNC: 17 U/L (ref 1–32)
BASOPHILS # BLD AUTO: 0.07 10*3/MM3 (ref 0–0.2)
BASOPHILS NFR BLD AUTO: 0.9 % (ref 0–1.5)
BILIRUB SERPL-MCNC: <0.2 MG/DL (ref 0–1.2)
BUN SERPL-MCNC: 15 MG/DL (ref 6–20)
BUN/CREAT SERPL: 14.7 (ref 7–25)
CALCIUM SPEC-SCNC: 9.1 MG/DL (ref 8.6–10.5)
CHLORIDE SERPL-SCNC: 104 MMOL/L (ref 98–107)
CHOLEST SERPL-MCNC: 205 MG/DL (ref 0–200)
CO2 SERPL-SCNC: 24 MMOL/L (ref 22–29)
CREAT SERPL-MCNC: 1.02 MG/DL (ref 0.57–1)
DEPRECATED RDW RBC AUTO: 38.8 FL (ref 37–54)
EGFRCR SERPLBLD CKD-EPI 2021: 74.2 ML/MIN/1.73
EOSINOPHIL # BLD AUTO: 0.46 10*3/MM3 (ref 0–0.4)
EOSINOPHIL NFR BLD AUTO: 6 % (ref 0.3–6.2)
ERYTHROCYTE [DISTWIDTH] IN BLOOD BY AUTOMATED COUNT: 12.2 % (ref 12.3–15.4)
GLOBULIN UR ELPH-MCNC: 3 GM/DL
GLUCOSE SERPL-MCNC: 85 MG/DL (ref 65–99)
HBA1C MFR BLD: 5.3 % (ref 4.8–5.6)
HCT VFR BLD AUTO: 37.3 % (ref 34–46.6)
HDLC SERPL-MCNC: 70 MG/DL (ref 40–60)
HGB BLD-MCNC: 12.3 G/DL (ref 12–15.9)
IMM GRANULOCYTES # BLD AUTO: 0.02 10*3/MM3 (ref 0–0.05)
IMM GRANULOCYTES NFR BLD AUTO: 0.3 % (ref 0–0.5)
INSULIN SERPL-ACNC: 18.4 UIU/ML (ref 2.6–24.9)
LDLC SERPL CALC-MCNC: 101 MG/DL (ref 0–100)
LDLC/HDLC SERPL: 1.36 {RATIO}
LYMPHOCYTES # BLD AUTO: 2.18 10*3/MM3 (ref 0.7–3.1)
LYMPHOCYTES NFR BLD AUTO: 28.6 % (ref 19.6–45.3)
MCH RBC QN AUTO: 28.8 PG (ref 26.6–33)
MCHC RBC AUTO-ENTMCNC: 33 G/DL (ref 31.5–35.7)
MCV RBC AUTO: 87.4 FL (ref 79–97)
MONOCYTES # BLD AUTO: 0.72 10*3/MM3 (ref 0.1–0.9)
MONOCYTES NFR BLD AUTO: 9.4 % (ref 5–12)
NEUTROPHILS NFR BLD AUTO: 4.17 10*3/MM3 (ref 1.7–7)
NEUTROPHILS NFR BLD AUTO: 54.8 % (ref 42.7–76)
NRBC BLD AUTO-RTO: 0 /100 WBC (ref 0–0.2)
PLATELET # BLD AUTO: 316 10*3/MM3 (ref 140–450)
PMV BLD AUTO: 9.7 FL (ref 6–12)
POTASSIUM SERPL-SCNC: 4.1 MMOL/L (ref 3.5–5.2)
PROT SERPL-MCNC: 6.9 G/DL (ref 6–8.5)
RBC # BLD AUTO: 4.27 10*6/MM3 (ref 3.77–5.28)
SODIUM SERPL-SCNC: 138 MMOL/L (ref 136–145)
TRIGL SERPL-MCNC: 199 MG/DL (ref 0–150)
TSH SERPL DL<=0.05 MIU/L-ACNC: 2.98 UIU/ML (ref 0.27–4.2)
VIT B12 BLD-MCNC: 385 PG/ML (ref 211–946)
VLDLC SERPL-MCNC: 34 MG/DL (ref 5–40)
WBC NRBC COR # BLD: 7.62 10*3/MM3 (ref 3.4–10.8)